# Patient Record
Sex: FEMALE | Race: WHITE | URBAN - METROPOLITAN AREA
[De-identification: names, ages, dates, MRNs, and addresses within clinical notes are randomized per-mention and may not be internally consistent; named-entity substitution may affect disease eponyms.]

---

## 2017-10-31 NOTE — PROGRESS NOTES
hospitalization, the order may or may not be in effect during this procedure. I give my doctor permission to give me blood or blood products. I understand that there are risks with receiving blood such as hepatitis, AIDS, fever, or allergic reaction. I acknowledge that the risks, benefits, and alternatives of this treatment have been explained to me and that no express or implied warranty has been given by the hospital, any blood bank, or any person or entity as to the blood or blood components transfused. At the discretion of my doctor, I agree to allow observers, equipment/product representatives and allow photographing, and/or televising of the procedure, provided my name or identity is maintained confidentially. I agree the hospital may dispose of or use for scientific or educational purposes any tissue, fluid, or body parts which may be removed.     ________________________________Date________Time______ am/pm  ([facility])  Patient or Signature of Closest Relative or Legal Guardian    ________________________________Date________Time______am/pm      Page 1 of  1  Witness

## 2017-11-03 RX ORDER — SODIUM CHLORIDE, SODIUM LACTATE, POTASSIUM CHLORIDE, CALCIUM CHLORIDE 600; 310; 30; 20 MG/100ML; MG/100ML; MG/100ML; MG/100ML
INJECTION, SOLUTION INTRAVENOUS CONTINUOUS
Status: DISCONTINUED | OUTPATIENT
Start: 2017-11-03 | End: 2017-11-07 | Stop reason: HOSPADM

## 2017-11-03 RX ORDER — OMEPRAZOLE 20 MG/1
20 CAPSULE, DELAYED RELEASE ORAL DAILY
COMMUNITY

## 2017-11-03 NOTE — PROGRESS NOTES
The following educational items and goals will be achieved upon completion of the patient's Pre-admission testing experience:             Identify the learner who is being assessed for education:  patient                    Ability to Learn:  Exhibits ability to grasp concepts and respond to questions: High  Ready to Learn: Yes  calm   Preferred Method of Learning:  written  Barriers to Learning: Verbalizes interest  Special Considerations due to cultural, Taoist, spiritual beliefs:  No  Language:  English  :  Chante Stoner  [x] Appropriate evaluation / integration of data as delineated by ASPAN Standards of Perianesthesia Nursing Practice    Pain scale and pain management   [x]Patient verbalizes understanding of pain scale and pain management  [x]Pre-operative determination of patients anticipated Post-Operative pain goal:   less than 4 of 10 on 10 point scale post op goal  [] Other     Medication(s) - Compliance with preop medication instructions  [x] Patient verbalizes understanding of preop medications (see [facility] Presurgical Instructions)    Instructions, Pre op                                                                                            [x] Patient verbalizes understanding of presurgical instructions as reviewed with phone interview nurse or in-person nurse review    Fall Risk Potential, Preoperatively                                                                                   []No preoperative risk identified  []Preop risk identified:                    []Sensory deficit        []Motor deficit        []Balance problem        []Home medication        []Uses assistive device                    []History of a Fall within the last 30 days    Goal(s) for fall prevention:  []Prevent fall or injury by requesting assistance with activities of daily living  []Patient / Significant other verbalizes understanding the need to call for assistance prior to getting out of bed during hospitalization      Infection Precautions                                                                                            [x] Patient understands implementation of Surgical Site Infection precautions (see [facility] Presurgical Instructions)     Patient Safety  [x] Patient identification verified  [x] Site verified    Instructions - Discharge Planning for Outpatients  [x] Patient / significant other voices understanding of home care and follow up procedures  [x] Encourage patient / significant other to review discharge instructions the day after procedure due to sedation on day of surgery    Anticipated Special Needs upon discharge:        [] Cooling device        [] Crutches       [] Mabrin Rimes        [] Wound Support device        [] Drain        [] Other       Instructions - Discharge Planning for Admitted patients  [] Patient / significant other understands plan for admission after surgery  [x] Patient / significant other understands plan for anticipated discharge dispostion        11/3/2017 1:58 PM Bryan Livingston

## 2017-11-03 NOTE — PROGRESS NOTES
[address] [employer] [employer]                          Date of Procedure 11/6/17 Time of Procedure 9:20 a.m. PRIOR TO PROCEDURE DATE:  1. Please follow any guidelines/instructions prior to your procedure as advised by your surgeon. 2. Arrange for someone to drive you home and be with you for the first 24 hours after discharge for your safety after your procedure for which you received sedation. Ensure it is someone we can share information with regarding your discharge. 3. You must contact your surgeon for instructions IF:   You are taking any blood thinners, aspirin, anti-inflammatory or vitamin E.   There is a change in your physical condition such as a cold, fever, rash, cuts, sores or any other infection, especially near your surgical site. 4. Do not drink alcohol the day before or day of your procedure. 5. A Pre-op History and Physical for surgery MUST be completed by your Physician or Urgent Care within 30 days of your procedure date. Please bring a copy with you on the day of your procedure and along with any other testing performed. THE DAY OF YOUR PROCEDURE:  1. Follow instructions for ARRIVAL TIME as DIRECTED BY YOUR SURGEON. If your surgeon does not give you a specific arrival time, please arrive at *7:20 a.m.    2. Enter the MAIN entrance from [address] [place] [address] and follow the signs to the free [facility] or [facility] parking (offered free of charge 6am-5pm). 3. Enter the Main Entrance of the hospital (do not enter from the lower level of the parking garage). Upon entrance, check in with the  at the main desk on your left. If no one is available at the desk, proceed into the [facility] Waiting Room and go through the door directly into the [facility]. There is a Check-in desk ACROSS from Room 5 (marked with a sign hanging from the ceiling). The phone number for the surgery center is [phone].     4. Please call [phone] option #2 option #2 if you have not been preregistered yet. On the day of your procedure bring your insurance card and photo ID. You will be registered at your bedside once brought back to your room. 5. DO NOT EAT OR DRINK ANYTHING AFTER MIDNIGHT. 6. MEDICATIONS    Take the following medications with a SMALL sip of water: Prilosec.  Use your usual dose of inhalers the morning of surgery. BRING your rescue inhaler with you to hospital.    Anesthesia does NOT want you to take insulin the morning of surgery. They will control your blood sugar while you are at the hospital. Please contact your ordering physician for instructions regarding your insulin the night before your procedure. If you have an insulin pump, please keep it set on basal rate. 7. Do not swallow water when brushing teeth. No gum, candy, mints or ice chips. Refrain from smoking or at least decrease the amount. 8. Dress in loose, comfortable clothing appropriate for redressing after your procedure. Do not wear jewelry (including body piercings), make-up (especially NO eye make-up), fingernail polish (NO toenail polish if foot/leg surgery), lotion, powders or metal hairclips. 9. Dentures, glasses, or contacts will need to be removed before your procedure. Bring cases for your glasses, contacts, dentures, or hearing aids to protect them while you are in surgery. 10. If you use a CPAP, please bring it with you on the day of your procedure. 11. We recommend that valuable personal  belongings, such as credit cards, cash, cell phones, e-tablets or jewelry, be left at home during your stay. The hospital will not be responsible for valuables that are not secured in the hospital safe. However, if your insurance requires a co-pay, you may want to bring a method of payment, i.e. Check or credit card, if you wish to pay your co-pay the day of surgery. 12. If you are to stay overnight, you may bring a bag with personal items.  Please have any

## 2017-11-06 ENCOUNTER — HOSPITAL ENCOUNTER (OUTPATIENT)
Dept: SURGERY | Age: 32
Discharge: HOME | End: 2017-11-06
Attending: SURGERY | Admitting: SURGERY

## 2017-11-06 VITALS
DIASTOLIC BLOOD PRESSURE: 82 MMHG | WEIGHT: 238 LBS | SYSTOLIC BLOOD PRESSURE: 133 MMHG | BODY MASS INDEX: 39.65 KG/M2 | HEART RATE: 77 BPM | RESPIRATION RATE: 16 BRPM | HEIGHT: 65 IN | TEMPERATURE: 96.5 F | OXYGEN SATURATION: 94 %

## 2017-11-06 LAB
ABO/RH: NORMAL
ANTIBODY SCREEN: NORMAL
PREGNANCY, URINE: NEGATIVE

## 2017-11-06 RX ORDER — SODIUM CHLORIDE 0.9 % (FLUSH) 0.9 %
10 SYRINGE (ML) INJECTION PRN
Status: DISCONTINUED | OUTPATIENT
Start: 2017-11-06 | End: 2017-11-07 | Stop reason: HOSPADM

## 2017-11-06 RX ORDER — ACETAMINOPHEN 10 MG/ML
1000 INJECTION, SOLUTION INTRAVENOUS ONCE
Status: COMPLETED | OUTPATIENT
Start: 2017-11-06 | End: 2017-11-06

## 2017-11-06 RX ORDER — APREPITANT 40 MG/1
40 CAPSULE ORAL ONCE
Status: COMPLETED | OUTPATIENT
Start: 2017-11-06 | End: 2017-11-06

## 2017-11-06 RX ORDER — ONDANSETRON 2 MG/ML
4 INJECTION INTRAMUSCULAR; INTRAVENOUS ONCE
Status: COMPLETED | OUTPATIENT
Start: 2017-11-06 | End: 2017-11-06

## 2017-11-06 RX ORDER — SODIUM CHLORIDE, SODIUM LACTATE, POTASSIUM CHLORIDE, CALCIUM CHLORIDE 600; 310; 30; 20 MG/100ML; MG/100ML; MG/100ML; MG/100ML
INJECTION, SOLUTION INTRAVENOUS CONTINUOUS
Status: DISCONTINUED | OUTPATIENT
Start: 2017-11-06 | End: 2017-11-07 | Stop reason: HOSPADM

## 2017-11-06 RX ORDER — SODIUM CHLORIDE 0.9 % (FLUSH) 0.9 %
10 SYRINGE (ML) INJECTION EVERY 12 HOURS SCHEDULED
Status: DISCONTINUED | OUTPATIENT
Start: 2017-11-06 | End: 2017-11-07 | Stop reason: HOSPADM

## 2017-11-06 RX ORDER — LIDOCAINE HYDROCHLORIDE 10 MG/ML
1 INJECTION, SOLUTION EPIDURAL; INFILTRATION; INTRACAUDAL; PERINEURAL
Status: ACTIVE | OUTPATIENT
Start: 2017-11-06 | End: 2017-11-06

## 2017-11-06 RX ORDER — LABETALOL HYDROCHLORIDE 5 MG/ML
5 INJECTION, SOLUTION INTRAVENOUS EVERY 10 MIN PRN
Status: DISCONTINUED | OUTPATIENT
Start: 2017-11-06 | End: 2017-11-07 | Stop reason: HOSPADM

## 2017-11-06 RX ORDER — SCOLOPAMINE TRANSDERMAL SYSTEM 1 MG/1
1 PATCH, EXTENDED RELEASE TRANSDERMAL
Status: DISCONTINUED | OUTPATIENT
Start: 2017-11-06 | End: 2017-11-07 | Stop reason: HOSPADM

## 2017-11-06 RX ORDER — METOCLOPRAMIDE HYDROCHLORIDE 5 MG/ML
10 INJECTION INTRAMUSCULAR; INTRAVENOUS ONCE
Status: COMPLETED | OUTPATIENT
Start: 2017-11-06 | End: 2017-11-06

## 2017-11-06 RX ORDER — SIMETHICONE 80 MG
80 TABLET,CHEWABLE ORAL ONCE
Status: COMPLETED | OUTPATIENT
Start: 2017-11-06 | End: 2017-11-06

## 2017-11-06 RX ORDER — HEPARIN SODIUM 5000 [USP'U]/ML
5000 INJECTION, SOLUTION INTRAVENOUS; SUBCUTANEOUS ONCE
Status: COMPLETED | OUTPATIENT
Start: 2017-11-06 | End: 2017-11-06

## 2017-11-06 RX ORDER — OXYCODONE HYDROCHLORIDE AND ACETAMINOPHEN 5; 325 MG/1; MG/1
2 TABLET ORAL EVERY 4 HOURS PRN
Qty: 35 TABLET | Refills: 0 | Status: SHIPPED | OUTPATIENT
Start: 2017-11-06 | End: 2017-12-06

## 2017-11-06 RX ORDER — HYDRALAZINE HYDROCHLORIDE 20 MG/ML
5 INJECTION INTRAMUSCULAR; INTRAVENOUS EVERY 10 MIN PRN
Status: DISCONTINUED | OUTPATIENT
Start: 2017-11-06 | End: 2017-11-07 | Stop reason: HOSPADM

## 2017-11-06 RX ORDER — CHLORHEXIDINE GLUCONATE 0.12 MG/ML
15 RINSE ORAL 2 TIMES DAILY
Status: DISCONTINUED | OUTPATIENT
Start: 2017-11-06 | End: 2017-11-07 | Stop reason: HOSPADM

## 2017-11-06 RX ORDER — KETOROLAC TROMETHAMINE 30 MG/ML
30 INJECTION, SOLUTION INTRAMUSCULAR; INTRAVENOUS ONCE
Status: COMPLETED | OUTPATIENT
Start: 2017-11-06 | End: 2017-11-06

## 2017-11-06 RX ORDER — ONDANSETRON 2 MG/ML
4 INJECTION INTRAMUSCULAR; INTRAVENOUS
Status: COMPLETED | OUTPATIENT
Start: 2017-11-06 | End: 2017-11-06

## 2017-11-06 RX ORDER — PROMETHAZINE HYDROCHLORIDE 25 MG/ML
6.25 INJECTION, SOLUTION INTRAMUSCULAR; INTRAVENOUS
Status: DISCONTINUED | OUTPATIENT
Start: 2017-11-06 | End: 2017-11-07 | Stop reason: HOSPADM

## 2017-11-06 RX ORDER — DEXAMETHASONE SODIUM PHOSPHATE 4 MG/ML
10 INJECTION, SOLUTION INTRA-ARTICULAR; INTRALESIONAL; INTRAMUSCULAR; INTRAVENOUS; SOFT TISSUE ONCE
Status: COMPLETED | OUTPATIENT
Start: 2017-11-06 | End: 2017-11-06

## 2017-11-06 RX ADMIN — Medication 0.5 MG: at 12:14

## 2017-11-06 RX ADMIN — KETOROLAC TROMETHAMINE 30 MG: 30 INJECTION, SOLUTION INTRAMUSCULAR; INTRAVENOUS at 13:25

## 2017-11-06 RX ADMIN — ACETAMINOPHEN 1000 MG: 10 INJECTION, SOLUTION INTRAVENOUS at 13:36

## 2017-11-06 RX ADMIN — DEXAMETHASONE SODIUM PHOSPHATE 10 MG: 4 INJECTION, SOLUTION INTRA-ARTICULAR; INTRALESIONAL; INTRAMUSCULAR; INTRAVENOUS; SOFT TISSUE at 08:44

## 2017-11-06 RX ADMIN — Medication 0.5 MG: at 11:16

## 2017-11-06 RX ADMIN — Medication 0.5 MG: at 11:30

## 2017-11-06 RX ADMIN — ONDANSETRON 4 MG: 2 INJECTION INTRAMUSCULAR; INTRAVENOUS at 08:46

## 2017-11-06 RX ADMIN — HEPARIN SODIUM 5000 UNITS: 5000 INJECTION, SOLUTION INTRAVENOUS; SUBCUTANEOUS at 08:39

## 2017-11-06 RX ADMIN — Medication 80 MG: at 13:38

## 2017-11-06 RX ADMIN — SODIUM CHLORIDE, SODIUM LACTATE, POTASSIUM CHLORIDE, CALCIUM CHLORIDE: 600; 310; 30; 20 INJECTION, SOLUTION INTRAVENOUS at 08:38

## 2017-11-06 RX ADMIN — METOCLOPRAMIDE HYDROCHLORIDE 10 MG: 5 INJECTION INTRAMUSCULAR; INTRAVENOUS at 08:47

## 2017-11-06 RX ADMIN — APREPITANT 40 MG: 40 CAPSULE ORAL at 08:43

## 2017-11-06 RX ADMIN — PROMETHAZINE HYDROCHLORIDE 6.25 MG: 25 INJECTION, SOLUTION INTRAMUSCULAR; INTRAVENOUS at 12:13

## 2017-11-06 RX ADMIN — ONDANSETRON 4 MG: 2 INJECTION INTRAMUSCULAR; INTRAVENOUS at 11:29

## 2017-11-06 ASSESSMENT — PAIN - FUNCTIONAL ASSESSMENT: PAIN_FUNCTIONAL_ASSESSMENT: 0-10

## 2017-11-06 ASSESSMENT — PAIN DESCRIPTION - LOCATION
LOCATION: ABDOMEN;CHEST
LOCATION: ABDOMEN;CHEST
LOCATION: ABDOMEN
LOCATION: ABDOMEN
LOCATION: ABDOMEN;CHEST
LOCATION: ABDOMEN

## 2017-11-06 ASSESSMENT — PAIN DESCRIPTION - PAIN TYPE
TYPE: SURGICAL PAIN
TYPE: SURGICAL PAIN;OTHER (COMMENT)

## 2017-11-06 ASSESSMENT — PAIN DESCRIPTION - FREQUENCY
FREQUENCY: CONTINUOUS

## 2017-11-06 ASSESSMENT — PAIN SCALES - GENERAL
PAINLEVEL_OUTOF10: 7
PAINLEVEL_OUTOF10: 7
PAINLEVEL_OUTOF10: 3
PAINLEVEL_OUTOF10: 3
PAINLEVEL_OUTOF10: 0
PAINLEVEL_OUTOF10: 6
PAINLEVEL_OUTOF10: 10

## 2017-11-06 NOTE — PROGRESS NOTES
[facility] PACU Education and Care Plan Goals  The following items will be achieved upon completion of the patient's transfer or discharge from the PACU:    Post Operative Pain Management                                                                               [x] Patient will verbalize understanding of pain scale and pain management. [x] Patient achieves predetermined pain goal of 4. [x] Self reports a comfort level acceptable for discharge to home.  [] Other     Fall Risk Potential  [x] Due to Perioperative medication administration  Additional Risk Identified:   [x] Sensory deficit         [x] Motor deficit         [x] Balance problem         [x] Home medication         [] Uses assistive device to ambulate    Goal(s) for fall prevention:  [x] Prevent fall or injury by calling for assistance with activity and use of siderails while hospitalized  [x] Prevent fall or injury by using assistance with activity after discharge to home.  [] Patient / Significant other verbalize understanding in use of any ordered assistive devices    Mobility Safety/ ADL  [x] Reach a functional mobility goal within limitations of the procedure. Infection Precautions                                                                                                            []Patient understands implementation of infection precautions (see [facility] Presurgical Instructions and SSI Prevention Handout)    Post operative Assessment and Care                                                             [x] Standards of care met as delineated by ASPAN.                                                               Discharge Education and Goals  [x]Patient voices understanding of PACU discharge criteria  [x]Outpatient / significant other voices understanding of home care and follow up procedures (See [facility] Procedure Discharge Instructions)  [x] Patient / significant other understanding of Special Needs:  [] Cooling

## 2017-11-06 NOTE — OP NOTE
[facility]   [address] [place] [address]. Tabatha Castaneda   Phone: [phone]  Fax:       Chasity Oviedo    17     Patient Name: Mason Olmos       : 65-     OPERATIVE REPORT     Surgeon: Radha Moyer DO   Assistant: Brittany Tripp MD      Facility: [facility]       Date of Admission: 17   Date of Surgery: 17     Preoperative Diagnosis:   1) morbid obesity   Post Operative Diagnosis:   1) morbid obesity   BMI:   42.26     Procedure:   Laparoscopic Sleeve Gastrectomy, 40 [place]     Anesthesia: GETA   DVT Prophylaxis: heparin/LMWH, compression stickings   Estimated Blood Loss: min cc's   Blood Transfusion: 0 units   Complications: None     Indications for Procedure: This is a 34year-old obese patient who came to see me in the office in consultation for weight loss surgery. This patient is a good candidate for the procedure and has been thoroughly educated on the risks and benefits of the procedure including the possibility of bleeding, infection, and even death. The patient has been extensively educated on post operative dietary requirements and the lifestyle changes needed for a successful outcome. The patient has made a commitment to make the necessary dietary and lifestyle changes for a successful outcome. Risks, benefits, and alternatives were discussed and the patient expresses full understanding and desires to proceed. Procedure Details: The patient was taken to the operating room and placed on the table in the supine position. Compression boots were in place and patient was given pre-op heparin. General endotracheal anesthesia was administered and the abdomen was prepped and draped in the usual sterile fashion. Preoperative antibiotics were administered. Timeout was performed. An incision was made in the LUQ and access was gained using a 5-mm Optiview non-bladed trocar. The abdomen was insufflated with carbon dioxide to 18 mm of mercury.

## 2017-11-06 NOTE — H&P
bilaterally, no crackles or wheezing    Abdomen:  soft, non-distended, non-tender, no rebound tenderness or guarding, normal active bowel sounds and no masses palpated    ASSESSMENT AND PLAN:    1. Patient seen and focused exam done today- no new changes since last physical exam on 10/24/17    2. Access to ancillary services are available per request of the provider.     Birgit Milner CNP     11/6/2017

## 2017-11-06 NOTE — PROGRESS NOTES
PACU Transfer to [facility]    Vitals:    11/06/17 1425   BP: (!) 143/87   Pulse: 75   Resp: 16   Temp: 96.5 °F (35.8 °C)   SpO2: 95%         Intake/Output Summary (Last 24 hours) at 11/06/17 1429  Last data filed at 11/06/17 1400   Gross per 24 hour   Intake             1380 ml   Output              125 ml   Net             1255 ml       Pain assessment:  Aching and gas pain in upper abdomen and chest which is greatly improving. All VSS within ASPAN AND ANESTHESIA STANDARDS. Pain Level: 3    Patient transferred to care of [facility] JOCELINE Childress    11/6/2017 2:29 PM

## 2017-11-06 NOTE — PROGRESS NOTES
Patient arrived from OR to PACU  Spot 17. Attached to monitoring system. Report received per CRNA. No problems reported intraoperatively. VSS. Will continue to observe closely.

## 2017-11-06 NOTE — ANESTHESIA PRE-OP
Department of Anesthesiology  Preprocedure Note       Name:  Maryam Sauer   Age:  34 y.o.  :  1985                                          MRN:  [id number]         Date:  2017      Surgeon:    Procedure:    Medications prior to admission:   Prior to Admission medications    Medication Sig Start Date End Date Taking? Authorizing Provider   omeprazole (PRILOSEC) 20 MG delayed release capsule Take 20 mg by mouth daily   Yes Historical Provider, MD       Current medications:    Current Outpatient Prescriptions   Medication Sig Dispense Refill    omeprazole (PRILOSEC) 20 MG delayed release capsule Take 20 mg by mouth daily       Current Facility-Administered Medications   Medication Dose Route Frequency Provider Last Rate Last Dose    ceFAZolin (ANCEF) 2 g in dextrose 3 % 50 mL IVPB (duplex)  2 g Intravenous Once Casimer Dire, DO        acetaminophen (OFIRMEV) infusion 1,000 mg  1,000 mg Intravenous Once Casimer Dire, DO        famotidine (PEPCID) injection 20 mg  20 mg Intravenous Once Casimer Dire, DO        ondansetron [facility]) injection 4 mg  4 mg Intravenous Once Casimer Dire, DO        metoclopramide (REGLAN) injection 10 mg  10 mg Intravenous Once Casimer Dire, DO        Dexamethasone Sodium Phosphate injection 10 mg  10 mg Intravenous Once Casimer Dire, DO        aprepitant (EMEND) capsule 40 mg  40 mg Oral Once Casimer Dire, DO        heparin (porcine) injection 5,000 Units  5,000 Units Subcutaneous Once Lis S Fentress, DO        chlorhexidine (PERIDEX) 0.12 % solution 15 mL  15 mL Mouth/Throat BID Casimer Dire, DO        lactated ringers infusion   Intravenous Continuous Lakeisha Mills MD           Allergies:  No Known Allergies    Problem List:  There is no problem list on file for this patient.       Past Medical History:        Diagnosis Date    IBS (irritable bowel syndrome)     Obesity     Prolonged emergence from general anesthesia        Past Surgical History: Procedure Laterality Date    CYST REMOVAL      neck    KNEE ARTHROSCOPY      TUBAL LIGATION         Social History:    Social History   Substance Use Topics    Smoking status: Current Every Day Smoker     Packs/day: 0.50    Smokeless tobacco: Never Used    Alcohol use Yes      Comment: 1 per week                                Ready to quit: Not Answered  Counseling given: Not Answered      Vital Signs (Current):   Vitals:    11/03/17 1343 11/06/17 0745   BP:  116/82   Pulse:  76   Resp:  16   Temp:  98 °F (36.7 °C)   TempSrc:  Oral   SpO2:  96%   Weight: 244 lb (110.7 kg) 238 lb (108 kg)   Height: 5' 5\" (1.651 m) 5' 5\" (1.651 m)                                              BP Readings from Last 3 Encounters:   11/06/17 116/82       NPO Status: Time of last liquid consumption: 1900                        Time of last solid consumption: 1900                        Date of last liquid consumption: 11/05/17                        Date of last solid food consumption: 11/05/17    BMI:   Wt Readings from Last 3 Encounters:   11/06/17 238 lb (108 kg)     Body mass index is 39.61 kg/m². Anesthesia Evaluation  Patient summary reviewed  Airway: Mallampati: II  TM distance: >3 FB   Neck ROM: full  Mouth opening: > = 3 FB Dental:          Pulmonary:                              Cardiovascular:                      Neuro/Psych:               GI/Hepatic/Renal:             Endo/Other:                     Abdominal:           Vascular:                                      Anesthesia Plan      general     ASA 3       Induction: intravenous. Anesthetic plan and risks discussed with patient.                       Isaak Shanks MD   11/6/2017

## 2017-11-06 NOTE — PROGRESS NOTES
Patient up in PACU and walking well, Denies needing to urinate at this time. VSS. Pain around a level 6. After walking for about 5 minutes requesting to lie back down for a bit.